# Patient Record
Sex: MALE | Race: OTHER | HISPANIC OR LATINO | URBAN - METROPOLITAN AREA
[De-identification: names, ages, dates, MRNs, and addresses within clinical notes are randomized per-mention and may not be internally consistent; named-entity substitution may affect disease eponyms.]

---

## 2024-06-04 ENCOUNTER — EMERGENCY (EMERGENCY)
Age: 24
LOS: 1 days | Discharge: HOME | End: 2024-06-04
Admitting: EMERGENCY MEDICINE
Payer: SELF-PAY

## 2024-06-04 VITALS
SYSTOLIC BLOOD PRESSURE: 116 MMHG | HEART RATE: 66 BPM | DIASTOLIC BLOOD PRESSURE: 60 MMHG | TEMPERATURE: 98 F | RESPIRATION RATE: 18 BRPM | OXYGEN SATURATION: 100 %

## 2024-06-04 VITALS
TEMPERATURE: 98 F | RESPIRATION RATE: 18 BRPM | HEART RATE: 77 BPM | SYSTOLIC BLOOD PRESSURE: 119 MMHG | DIASTOLIC BLOOD PRESSURE: 77 MMHG | OXYGEN SATURATION: 99 % | WEIGHT: 161.27 LBS

## 2024-06-04 PROCEDURE — 99283 EMERGENCY DEPT VISIT LOW MDM: CPT

## 2024-06-04 NOTE — ED PROVIDER NOTE - PATIENT PORTAL LINK FT
You can access the FollowMyHealth Patient Portal offered by [facility] by registering at the following website: [email or web address]. By joining [facility]’s FollowMyHealth portal, you will also be able to view your health information using other applications (apps) compatible with our system.

## 2024-06-04 NOTE — ED ADULT NURSE NOTE - NSFALLUNIVINTERV_ED_ALL_ED
Bed/Stretcher in lowest position, wheels locked, appropriate side rails in place/Call bell, personal items and telephone in reach/Instruct patient to call for assistance before getting out of bed/chair/stretcher/Non-slip footwear applied when patient is off stretcher/[place] to call system/Physically safe environment - no spills, clutter or unnecessary equipment/Purposeful proactive rounding/Room/bathroom lighting operational, light cord in reach

## 2024-06-04 NOTE — ED PROVIDER NOTE - OBJECTIVE STATEMENT
Patient is a 24-year-old male with no significant past medical history who presented to the ED with right-sided facial droop x 3 weeks.  Patient reports he did not seek medical evaluation up until today.  Patient reports having tearing from his right thigh and droop to the right side of his mouth.  Patient denies any known insect bites.  Patient otherwise denies vision changes, one-sided weakness, slurred speech, chest pain, palpitations, shortness of breath, nausea, vomiting.     used ID# [id number]

## 2024-06-04 NOTE — ED ADULT TRIAGE NOTE - CHIEF COMPLAINT QUOTE
Pt c/o right sided facial droop X 3 weeks. Denies chest pain, sob, abd pain, n/v/d, fevers/chills. Denies hx Pt c/o right sided facial paralysis/droop X 3 weeks. Denies chest pain, sob, abd pain, n/v/d, fevers/chills. Denies hx

## 2024-06-04 NOTE — ED PROVIDER NOTE - CLINICAL SUMMARY MEDICAL DECISION MAKING FREE TEXT BOX
Patient is a 24-year-old male with no significant past medical history presented to ED with right-sided facial droop x 3 weeks.  Patient with no forehead sparing on exam consistent with Bell's palsy.  Patient outside of window for treatment at this time.  Will send Lyme titers, provide artificial tears and give patient neurologic follow-up.  Patient otherwise neurologically intact.  Strict return precautions discussed.

## 2024-06-04 NOTE — ED ADULT NURSE NOTE - CHIEF COMPLAINT QUOTE
Pt c/o right sided facial paralysis/droop X 3 weeks. Denies chest pain, sob, abd pain, n/v/d, fevers/chills. Denies hx

## 2024-06-04 NOTE — ED PROVIDER NOTE - NSFOLLOWUPINSTRUCTIONS_ED_ALL_ED_FT
Aplicar lágrimas artificiales cada 2 horas.  Seguimiento con Neurología  Regrese a la magdalena de emergencias si algún síntoma empeora o si tiene alguna otra inquietud.    Parálisis facial en los adultos  Boggs's Palsy, Adult    La parálisis facial es ruby incapacidad a corto plazo para  los músculos de ruby parte del josé miguel. La incapacidad para moverse, también llamada parálisis, es el resultado de la inflamación o la compresión del séptimo nervio craneal. Citlalli nervio se desplaza a lo virginia del cráneo y por debajo de la oreja hasta el lado de la eliana. Citlalli nervio es responsable de los movimientos faciales, que incluyen parpadear, cerrar los ojos, sonreír y fruncir el ceño.    ¿Cuáles son las causas?  Se desconoce la causa exacta de esta afección. Puede ser causada por la infección de un virus, norman el virus de la varicela (herpes zóster), de Otoniel-Beth o de las paperas.    ¿Qué incrementa el riesgo?  Es más probable que sufra esta afección si:  Está embarazada.  Tiene diabetes.  Recientemente ha tenido ruby infección en la nariz, la garganta o las vías respiratorias.  Tiene debilitado el sistema de defensa del organismo (sistema inmunitario).  Tuvo ruby lesión facial, norman ruby fractura.  Tiene antecedentes familiares de parálisis facial.  ¿Cuáles son los signos o los síntomas?  Los síntomas de esta afección incluyen:  Debilidad en un lado del josé miguel.  Caída del párpado y de la comisura de la boca.  Exceso de lagrimeo en rex de los ojos.  Dificultad para cerrar el párpado.  Atilio seco.  Babeo.  Sequedad en la boca.  Cambios en el sentido del gusto.  Cambio en el aspecto facial.  Dolor detrás de ruby oreja.  Zumbido en ruby o ambas orejas.  Sensibilidad al ruido en ruby oreja.  Contracción facial.  Dolor de ole.  Trastornos del habla.  Mareos.  Dificultad para comer o beber.  La mayor parte del tiempo, solo ruby parte del josé miguel se ve afectada. En casos muy poco frecuentes, la parálisis facial puede afectar todo el josé miguel.    ¿Cómo se diagnostica?  Esta afección se diagnostica en función de lo siguiente:  Roma síntomas.  Roma antecedentes médicos.  Un examen físico.  Es posible que, además, deba consultar a un especialista en trastornos de los nervios (neurólogo) o enfermedades y afecciones oculares (oftalmólogo). Pueden hacerle estudios, por ejemplo:  Ruby prueba para controlar si hubo daño nervioso (electromiograma).  Estudios de diagnóstico por imágenes, norman ruby exploración por tomografía computarizada (TC) o resonancia magnética (RM).  Análisis de sourav.  ¿Cómo se trata?  Esta afección afecta a cada persona de un modo diferente. A veces, los síntomas desaparecen sin tratamiento en el plazo de unas semanas. Si el tratamiento es necesario, varía de persona a persona. El objetivo del tratamiento es reducir la inflamación y proteger los ojos para que no se dañen. El tratamiento de la parálisis facial puede incluir lo siguiente:  Medicamentos, norman por ejemplo:  Corticoesteroides para reducir la hinchazón y la inflamación.  Medicamentos antivirales.  Analgésicos, norman aspirina, paracetamol o ibuprofeno.  Gotas oftálmicas o ungüento para mantener los ojos humectados.  Protección para los ojos, si no puede cerrar el atilio.  Ejercicios o masajes para recuperar la fuerza y la función del músculo (fisioterapia).  Siga estas instrucciones en castanon casa:    Use los medicamentos de venta valentina y los recetados solamente norman se lo haya indicado el médico.  Si el atilio está afectado:  Aplique el ungüento o las gotas oftálmicas para mantener los ojos humectados rissa norman se lo haya indicado el médico.  Siga las instrucciones del médico para el cuidado y la protección de los ojos norman se lo haya indicado el médico.  Zabrina los ejercicios de fisioterapia norman se lo haya indicado el médico.  Cumpla con todas las visitas de seguimiento. [place] es importante.  Comuníquese con un médico si:  Tiene fiebre o escalofríos.  Los síntomas no mejoran en un lapso de 2 a 3 semanas, o empeoran.  El atilio está franklin, irritado o le duele.  Aparecen nuevos síntomas.  Solicite ayuda de inmediato si:  Siente debilidad o entumecimiento en alguna parte del cuerpo que no sea el josé miguel.  Tiene dificultad para tragar.  Tiene dolor o rigidez en el shawanda.  Tiene mareos o le falta el aire.  Resumen  La parálisis facial es ruby incapacidad a corto plazo para  los músculos de ruby parte del josé miguel. La incapacidad para moverse es el resultado de la inflamación o la compresión del nervio facial.  Esta afección afecta a cada persona de un modo diferente. A veces, los síntomas desaparecen sin tratamiento en el plazo de unas semanas.  Si el tratamiento es necesario, varía de persona a persona. El objetivo del tratamiento es reducir la inflamación y proteger los ojos para que no se dañen.  Comuníquese con el médico si los síntomas no mejoran en el lapso de 2 a 3 semanas, o empeoran.  Esta información no tiene norman fin reemplazar el consejo del médico. Asegúrese de hacerle al médico cualquier pregunta que tenga.

## 2024-06-04 NOTE — ED STATDOCS - OBJECTIVE STATEMENT
R facial droop x 1 month. Unchanged. No headache. No diff walking or extremity weakness.    I performed a medical screening examination and determined this patient to be medically stable and will transfer to the [facility] adult ED for further care. Vital signs reviewed. Heart and lung exam done and both did not reveal concerns for immediate intervention. Handoff given to ED [facility] Attg

## 2024-06-04 NOTE — ED ADULT NURSE NOTE - OBJECTIVE STATEMENT
Pt received to intake room 10A a&ox4, ambulatory, on room air coming to ED c/o facial droop. Pt states left sided facial droop started x3 weeks ago, neuro otherwise intact. Pt respirations even and unlabored, chest rise and fall equal b/l. Pt denies chest pain, HA, SOB, dizziness, N/V/D, fever/chills. Labs collected and sent. Stretcher in lowest position, pt safety maintained.

## 2024-06-04 NOTE — ED PROVIDER NOTE - PHYSICAL EXAMINATION
NEURO: + right sided facial droop, + no forehead sparing, strength/sensation and pulses intact in all extremities, normal gait
Elizabeth

## 2024-06-05 LAB
B BURGDOR C6 AB SER-ACNC: NEGATIVE — SIGNIFICANT CHANGE UP
B BURGDOR IGG+IGM SER-ACNC: 0.1 INDEX — SIGNIFICANT CHANGE UP (ref 0.01–0.89)